# Patient Record
Sex: MALE | Race: WHITE | NOT HISPANIC OR LATINO | Employment: FULL TIME | URBAN - NONMETROPOLITAN AREA
[De-identification: names, ages, dates, MRNs, and addresses within clinical notes are randomized per-mention and may not be internally consistent; named-entity substitution may affect disease eponyms.]

---

## 2024-11-06 ENCOUNTER — OFFICE VISIT (OUTPATIENT)
Dept: PSYCHIATRY | Facility: CLINIC | Age: 44
End: 2024-11-06
Payer: COMMERCIAL

## 2024-11-06 ENCOUNTER — PATIENT ROUNDING (BHMG ONLY) (OUTPATIENT)
Dept: PSYCHIATRY | Facility: CLINIC | Age: 44
End: 2024-11-06
Payer: COMMERCIAL

## 2024-11-06 DIAGNOSIS — Z63.9 RELATIONSHIP DYSFUNCTION: ICD-10-CM

## 2024-11-06 DIAGNOSIS — F41.9 MODERATE ANXIETY: Primary | ICD-10-CM

## 2024-11-06 DIAGNOSIS — F32.A MINOR DEPRESSION: ICD-10-CM

## 2024-11-06 PROCEDURE — 90791 PSYCH DIAGNOSTIC EVALUATION: CPT | Performed by: COUNSELOR

## 2024-11-06 SDOH — SOCIAL STABILITY - SOCIAL INSECURITY: PROBLEM RELATED TO PRIMARY SUPPORT GROUP, UNSPECIFIED: Z63.9

## 2024-11-06 NOTE — PROGRESS NOTES
November 6, 2024    Hello, may I speak with Bartolo Marshall?    My name is Adia      I am  with [facility]  [address] [place] [address]  [place] [address] [phone].    Before we get started may I verify your date of birth? 1980    I am calling to officially welcome you to our practice and ask about your recent visit. Is this a good time to talk? yes    Tell me about your visit with us. What things went well?  everything went well.       We're always looking for ways to make our patients' experiences even better. Do you have recommendations on ways we may improve?  no, I can't think of anything.    Overall were you satisfied with your first visit to our practice? yes       I appreciate you taking the time to speak with me today. Is there anything else I can do for you? no      Thank you, and have a great day.
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
## 2024-11-21 ENCOUNTER — OFFICE VISIT (OUTPATIENT)
Dept: PSYCHIATRY | Facility: CLINIC | Age: 44
End: 2024-11-21
Payer: COMMERCIAL

## 2024-11-21 DIAGNOSIS — R46.89 COMPULSIVE BEHAVIORS: ICD-10-CM

## 2024-11-21 DIAGNOSIS — F43.20 ADJUSTMENT DISORDER, UNSPECIFIED TYPE: Primary | ICD-10-CM

## 2024-11-21 PROCEDURE — 90837 PSYTX W PT 60 MINUTES: CPT | Performed by: COUNSELOR

## 2024-12-31 NOTE — PROGRESS NOTES
"    PROGRESS NOTE  Data:  Bartolo Marshall came in 2024 for his regularly scheduled therapy session starting at 10 AM and ending at 1 AM, with Indira Blanton [facility].    Patient was accompanied by Eliz, his significant other.     (Scales based on 0 - 10 with 10 being the worst)  Depression: 0 Anxiety: 0     HPI:   Patient shared, \"I tried to change and noticed that it always causes arguments, normally I have always felt that are not looking for an argument, I do not back down either.\"    Eliz: \"Yes, we argue about trust, the same stuff continually.  There are no more issues about hiding locations.\"    Eliz:  \"I've become somebody I don't like.  When I was , I never checked my 's phone.  Bartolo talks about the past, but it's the present names, calls, messages.\"      Patient:  \"It hasn't the past 2 weeks.\"  Eliz:  \"But it has.  If I question anything, it's gotten to the point that I don't accept it.  I think it's a bigger problem.  I think he wants to change but doesn't know how.\"  \"Yesterday there was an issue of us talking about a couple that goes to our Confucianism.  He cheated.  I know her.\"    Patient shared about attending his daughter, Edna's (age 24) wedding recently.  \"I almost teared up.  I'm proud of her and told her how pretty she was.  He mother  this past year.  We co-parented.\"    Patient rated his emotional connection with Eliz at 8-9/10.  Eliz didn't get around to sharing her rating perspective.    Eliz:  \"In the beginning, Bartolo was that kind of person .  He was very forthcoming about his past life style and what he enjoyed but we wanted to be together but it changed when he couldn't change his behavior.  His openness was shutting down. It was a turning point in our relationship.  I feel he just wanted me to accept his old behavior and started hiding it.  I'm not going to be Kalani.\"    Patient: \"Now it's more of a routine.\"    \"My sexual relationships are with close " "friends.  I listen when they are depressed and call me.  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  I've never attached to any of them.  At first I thought we could.  I accept that Eliz seems to have paranoid jealousy.\"    Eliz:  \"He doesn't like to be without sex, it's non-stop.   That's the reason I researched you.  He has a problem.  Bartolo sent some pictures and messages to 8 different females.  He seeks out more and I never even knew.  He used the same exact wording.  I think it's more like an addiction.\"  \"It makes me feel like I'm not important.  If we're in a relationship, I don't want him seek intimacy with others.\"    Patient:  \"I'm an adrenalin junkie.  I enjoy demolition.\"    Clinical Maneuvering/Intervention:  Assisted patient in processing above session content; acknowledged and normalized patient’s thoughts, feelings, and concerns.     Discussed how Patient must make a decision if he is willing to change his lifestyle of having multiple sex partners to be faithful to one woman, if so trust has to be earned on both sides which is possible.    Allowed patient to freely discuss issues without interruption or judgment. Provided safe, confidential environment to facilitate the development of positive therapeutic relationship and encourage open, honest communication. Assisted patient in identifying risk factors which would indicate the need for higher level of care including thoughts to harm self or others and/or self-harming behavior and encouraged patient to contact this office, call 911, or present to the nearest emergency room should any of these events occur. Discussed crisis intervention services and means to access.  Patient adamantly and convincingly denies current suicidal or homicidal ideation or perceptual disturbance.        Assessment     Patient appears uncertain if he is willing to change his lifestyle.  Patient does not connect sexuality with love or attachment.  He may be struggling with " sexual addiction.    Diagnosis:   Encounter Diagnoses   Name Primary?    Adjustment disorder, unspecified type Yes    Compulsive behaviors        Adjustment disorder, unspecified type [F43.20]    Mental Status Exam  Hygiene:  good  Dress:  casual  Attitude:  Cooperative  Motor Activity:  Appropriate  Speech:  Normal  Mood:  within normal limits  Affect:  calm and pleasant  Thought Processes:  Goal directed and Linear  Thought Content:  normal  Suicidal Thoughts:  does not  Homicidal Thoughts:  does not  Crisis Safety Plan: yes, to come to the emergency room.  Hallucinations:  does not          Patient's Support Network Includes:  significant other, mother, and friends    Progress toward goal: Not at goal    Functional Status: Mild impairment     Prognosis: Fair with Ongoing Treatment       Plan         Patient will adhere to medication regimen as prescribed and report any side effects. Patient will contact this office, call 911 or present to the nearest emergency room should suicidal or homicidal ideations occur. Provide Cognitive Behavioral Therapy and Integrative Therapy to improve functioning, maintain stability, and avoid decompensation and the need for higher level of care.            Return today (on 11/21/2024), or if symptoms worsen or fail to improve.            This document electronically signed by Indira Blanton, SILVANA, NCC, CSAT  January 8, 2025 19:11 EST    Plan